# Patient Record
Sex: FEMALE | Race: WHITE | NOT HISPANIC OR LATINO | Employment: OTHER | URBAN - METROPOLITAN AREA
[De-identification: names, ages, dates, MRNs, and addresses within clinical notes are randomized per-mention and may not be internally consistent; named-entity substitution may affect disease eponyms.]

---

## 2023-03-03 LAB
NON-UH HIE C-REACTIVE PROTEIN, QUANTITATIVE: <0.4 MG/DL (ref 0–0.9)
NON-UH HIE CA 125 TM: 10 U/ML (ref 0–35)
NON-UH HIE SED RATE WESTERGREN: 14 MM/HR (ref 0–30)

## 2023-03-06 LAB
NON-UH HIE ANA PATTERN: NORMAL
NON-UH HIE ANTI-NUCLEAR AB QUANT: NORMAL
NON-UH HIE ANTI-NUCLEAR ANTIBODY: POSITIVE

## 2023-03-07 LAB — NON-UH HIE RHEUMATOID FACTOR: NEGATIVE

## 2023-03-12 NOTE — RESULT ENCOUNTER NOTE
Please call Anjelica  Her antinuclear antibody is positive and the titer is 1 in 160 which is significant  I recommend she see a rheumatologist and I will refer

## 2023-03-20 ENCOUNTER — TELEPHONE (OUTPATIENT)
Dept: PRIMARY CARE | Facility: CLINIC | Age: 68
End: 2023-03-20

## 2023-03-20 DIAGNOSIS — R76.8 POSITIVE ANA (ANTINUCLEAR ANTIBODY): ICD-10-CM

## 2023-03-20 NOTE — TELEPHONE ENCOUNTER
----- Message from Lacey Lara MD sent at 3/11/2023  8:02 PM EST -----  Please call Anjelica  Her antinuclear antibody is positive and the titer is 1 in 160 which is significant  I recommend she see a rheumatologist and I will refer.  the rheumatoid factor is negative   Referral entered.

## 2023-03-21 ENCOUNTER — TELEPHONE (OUTPATIENT)
Dept: PRIMARY CARE | Facility: CLINIC | Age: 68
End: 2023-03-21

## 2025-03-28 LAB
NON-UH HIE A/G RATIO: 1.1
NON-UH HIE ALB: 4 G/DL (ref 3.4–5)
NON-UH HIE ALK PHOS: 114 UNIT/L (ref 45–117)
NON-UH HIE BILIRUBIN, TOTAL: 0.6 MG/DL (ref 0.3–1.2)
NON-UH HIE BUN/CREAT RATIO: 14.6
NON-UH HIE BUN: 19 MG/DL (ref 9–23)
NON-UH HIE CALCIUM, IONIZED: 1.29 MMOL/L (ref 1.12–1.32)
NON-UH HIE CALCIUM: 10.6 MG/DL (ref 8.7–10.4)
NON-UH HIE CALCULATED LDL CHOLESTEROL: 100 MG/DL (ref 60–130)
NON-UH HIE CALCULATED OSMOLALITY: 283 MOSM/KG (ref 275–295)
NON-UH HIE CHLORIDE: 106 MMOL/L (ref 98–107)
NON-UH HIE CHOLESTEROL: 176 MG/DL (ref 100–200)
NON-UH HIE CO2, VENOUS: 25 MMOL/L (ref 20–31)
NON-UH HIE CREATININE: 1.3 MG/DL (ref 0.5–0.8)
NON-UH HIE DIRECT LDL: 99 MG/DL
NON-UH HIE GFR AA: 49
NON-UH HIE GLOBULIN: 3.5 G/DL
NON-UH HIE GLOMERULAR FILTRATION RATE: 41 ML/MIN/1.73M?
NON-UH HIE GLUCOSE: 89 MG/DL (ref 74–106)
NON-UH HIE GOT: 23 UNIT/L (ref 15–37)
NON-UH HIE GPT: 20 UNIT/L (ref 10–49)
NON-UH HIE HDL CHOLESTEROL: 58 MG/DL (ref 40–60)
NON-UH HIE HGB A1C: 5.3 %
NON-UH HIE I-PTH: 92 PG/ML (ref 18.4–80.1)
NON-UH HIE K: 4.2 MMOL/L (ref 3.5–5.1)
NON-UH HIE NA: 141 MMOL/L (ref 135–145)
NON-UH HIE TOTAL CHOL/HDL CHOL RATIO: 3
NON-UH HIE TOTAL PROTEIN: 7.5 G/DL (ref 5.7–8.2)
NON-UH HIE TRIGLYCERIDES: 89 MG/DL (ref 30–150)
NON-UH HIE TSH: 0.96 UIU/ML (ref 0.55–4.78)
NON-UH HIE VIT D 25: 47 NG/ML

## 2025-06-09 LAB
NON-UH HIE A/G RATIO: 1.2
NON-UH HIE ALB: 4 G/DL (ref 3.4–5)
NON-UH HIE ALB: 4 G/DL (ref 3.4–5)
NON-UH HIE ALK PHOS: 111 UNIT/L (ref 45–117)
NON-UH HIE BILIRUBIN, TOTAL: 0.6 MG/DL (ref 0.3–1.2)
NON-UH HIE BUN/CREAT RATIO: 13.6
NON-UH HIE BUN/CREAT RATIO: 13.6
NON-UH HIE BUN: 15 MG/DL (ref 9–23)
NON-UH HIE BUN: 15 MG/DL (ref 9–23)
NON-UH HIE CALCIUM: 10.4 MG/DL (ref 8.7–10.4)
NON-UH HIE CALCIUM: 10.4 MG/DL (ref 8.7–10.4)
NON-UH HIE CALCULATED OSMOLALITY: 279 MOSM/KG (ref 275–295)
NON-UH HIE CALCULATED OSMOLALITY: 281 MOSM/KG (ref 275–295)
NON-UH HIE CHLORIDE: 106 MMOL/L (ref 98–107)
NON-UH HIE CHLORIDE: 107 MMOL/L (ref 98–107)
NON-UH HIE CO2, VENOUS: 25 MMOL/L (ref 20–31)
NON-UH HIE CO2, VENOUS: 25 MMOL/L (ref 20–31)
NON-UH HIE CORRECTED CALCIUM: ABNORMAL MG/DL (ref 8.5–10.5)
NON-UH HIE CREATININE 24 HR, U: 1 GM/24HR (ref 0.6–1.8)
NON-UH HIE CREATININE: 1.1 MG/DL (ref 0.5–0.8)
NON-UH HIE CREATININE: 1.1 MG/DL (ref 0.5–0.8)
NON-UH HIE GFR AA: 60
NON-UH HIE GFR AA: 60
NON-UH HIE GFR ESTIMATED: 49
NON-UH HIE GLOBULIN: 3.5 G/DL
NON-UH HIE GLOMERULAR FILTRATION RATE: 49 ML/MIN/1.73M?
NON-UH HIE GLOMERULAR FILTRATION RATE: 49 ML/MIN/1.73M?
NON-UH HIE GLUCOSE: 102 MG/DL (ref 74–106)
NON-UH HIE GLUCOSE: 103 MG/DL (ref 74–106)
NON-UH HIE GOT: 20 UNIT/L (ref 15–37)
NON-UH HIE GPT: 22 UNIT/L (ref 10–49)
NON-UH HIE I-PTH: 83 PG/ML (ref 18.4–80.1)
NON-UH HIE K: 3.9 MMOL/L (ref 3.5–5.1)
NON-UH HIE K: 3.9 MMOL/L (ref 3.5–5.1)
NON-UH HIE NA: 139 MMOL/L (ref 135–145)
NON-UH HIE NA: 140 MMOL/L (ref 135–145)
NON-UH HIE PHOSPHORUS: 3 MG/DL (ref 2–5.1)
NON-UH HIE TOTAL PROTEIN: 7.5 G/DL (ref 5.7–8.2)
NON-UH HIE U CRETN: 42.3 MG/DL
NON-UH HIE U VOLCRET: 2475 ML
NON-UH HIE VIT D 25: 51 NG/ML

## 2025-06-13 LAB — NON-UH HIE MISC SENDOUT: NORMAL

## 2025-08-07 ENCOUNTER — OFFICE VISIT (OUTPATIENT)
Dept: SURGERY | Facility: CLINIC | Age: 70
End: 2025-08-07
Payer: MEDICARE

## 2025-08-07 VITALS
HEART RATE: 90 BPM | DIASTOLIC BLOOD PRESSURE: 79 MMHG | HEIGHT: 64 IN | BODY MASS INDEX: 27.14 KG/M2 | WEIGHT: 159 LBS | SYSTOLIC BLOOD PRESSURE: 136 MMHG

## 2025-08-07 DIAGNOSIS — E83.52 HYPERCALCEMIA: Primary | ICD-10-CM

## 2025-08-07 PROCEDURE — 76536 US EXAM OF HEAD AND NECK: CPT | Performed by: SURGERY

## 2025-08-07 PROCEDURE — 99205 OFFICE O/P NEW HI 60 MIN: CPT | Performed by: SURGERY

## 2025-08-07 PROCEDURE — 3008F BODY MASS INDEX DOCD: CPT | Performed by: SURGERY

## 2025-08-07 PROCEDURE — 99215 OFFICE O/P EST HI 40 MIN: CPT | Mod: 25 | Performed by: SURGERY

## 2025-08-07 PROCEDURE — 1160F RVW MEDS BY RX/DR IN RCRD: CPT | Performed by: SURGERY

## 2025-08-07 PROCEDURE — 1159F MED LIST DOCD IN RCRD: CPT | Performed by: SURGERY

## 2025-08-07 RX ORDER — METOPROLOL TARTRATE 25 MG/1
12.5 TABLET, FILM COATED ORAL 2 TIMES DAILY
COMMUNITY

## 2025-08-07 RX ORDER — CLINDAMYCIN HYDROCHLORIDE 300 MG/1
CAPSULE ORAL
COMMUNITY
Start: 2025-07-18

## 2025-08-07 RX ORDER — FAMOTIDINE 40 MG/1
40 TABLET, FILM COATED ORAL NIGHTLY
COMMUNITY

## 2025-08-07 RX ORDER — LEVOTHYROXINE SODIUM 88 UG/1
88 TABLET ORAL
COMMUNITY

## 2025-08-07 RX ORDER — METHYLPREDNISOLONE 4 MG/1
TABLET ORAL
COMMUNITY
Start: 2024-09-09

## 2025-08-07 RX ORDER — HYDROCORTISONE ACETATE 25 MG/1
SUPPOSITORY RECTAL
COMMUNITY
Start: 2025-05-01

## 2025-08-07 RX ORDER — ATORVASTATIN CALCIUM 10 MG/1
10 TABLET, FILM COATED ORAL DAILY
COMMUNITY

## 2025-08-07 ASSESSMENT — ENCOUNTER SYMPTOMS
CONSTITUTIONAL NEGATIVE: 1
CARDIOVASCULAR NEGATIVE: 1
EYES NEGATIVE: 1
ENDOCRINE NEGATIVE: 1
RESPIRATORY NEGATIVE: 1
MUSCULOSKELETAL NEGATIVE: 1
PSYCHIATRIC NEGATIVE: 1
NEUROLOGICAL NEGATIVE: 1

## 2025-08-07 NOTE — PROGRESS NOTES
Subjective   Patient ID: Kathrin Sutherland is a 69 y.o. female who presents for surgical consultation for primary hyperparathyroidism.    HPI I saw Mrs. Sutherland in surgery clinic today.  She is a pleasant 69-year-old woman referred by Dr. Jordan Vazquez of medical endocrinology for likely primary hyperparathyroidism.    Patient has had high normal to mildly elevated calcium levels ranging from 10.4-10.6.  Her ionized calcium level at last check was normal at 1.29.  However her parathyroid hormone levels been slightly elevated ranging from 83-92 with the upper end of normal being 80 for the labs she went to.  Vitamin D is normal at 51.  Biochemically this looks like at least mild primary hyperparathyroidism.    Interestingly, she brought updated blood work with her today.  Her most recent calcium was actually low at 8.4.  Obviously this is only 1 value but it is certainly confusing.    In terms of symptoms associated with this, from a bone standpoint-she does have DEXA bone density test showing osteoporosis.  No recent fractures.  Renal-no history of kidney stones.  I saw that she had a 24-hour urine creatinine but we do not see that she had a 24-hour urine calcium level done.  She looked on her own patient chart and did not see that either.  GI-no peptic ulcer disease no pancreatitis no constipation.  Neurocognitive energy levels are good no depression.    She was diagnosed with MGUS in the past.    Family history no hyperparathyroidism.    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  Potential occupational exposure.    Review of Systems   Constitutional: Negative.    HENT: Negative.     Eyes: Negative.    Respiratory: Negative.     Cardiovascular: Negative.    Endocrine: Negative.    Musculoskeletal: Negative.    Neurological: Negative.    Psychiatric/Behavioral: Negative.         Objective   Physical Exam  Vitals reviewed.    Constitutional:       Appearance: Normal appearance.     Eyes:      Comments: No proptosis   Neck:      Vascular: No carotid bruit.      Comments: Her thyroid feels normal no palpable masses trachea midline  Cardiovascular:      Rate and Rhythm: Normal rate and regular rhythm.      Heart sounds: Normal heart sounds.   Pulmonary:      Effort: Pulmonary effort is normal. No respiratory distress.      Breath sounds: Normal breath sounds. No wheezing or rales.     Musculoskeletal:         General: Normal range of motion.   Lymphadenopathy:      Cervical: No cervical adenopathy.     Skin:     General: Skin is warm.     Neurological:      General: No focal deficit present.      Mental Status: She is alert and oriented to person, place, and time.     Psychiatric:         Mood and Affect: Mood normal.         Behavior: Behavior normal.     Patient ID: Kathrin Sutherland is a 69 y.o. female.    General    Date/Time: 8/7/2025 2:05 PM    Performed by: Jose De Jesus Spear MD  Authorized by: Jose De Jesus Spear MD    Consent:     Consent obtained:  Verbal    Consent given by:  Patient    Risks, benefits, and alternatives were discussed: yes      Alternatives discussed:  No treatment and observation  Universal protocol:     Procedure explained and questions answered to patient or proxy's satisfaction: yes      Relevant documents present and verified: yes      Test results available: yes      Imaging studies available: yes    Procedure specific details:      Neck ultrasound    In the office of the neck ultrasound a 6-15 MHz linear ultrasound probe to look for possible parathyroid adenoma in a patient with biochemical evidence suspicious for primary hyperparathyroidism.  Patient's thyroid gland is extremely heterogeneous and highly consistent with Hashimoto's thyroiditis.  There is no hypoechoic extrathyroidal mass seen in any position that would be suggestive of a parathyroid adenoma.  We did specifically focus in the area of the left  superior thyroid region at that area was questioned on sestamibi but also read as negative for an adenoma    Images were captured and reviewed with the patient.  Post-procedure details:     Procedure completion:  Tolerated      Assessment/Plan    Mrs. Sutherland has had slightly elevated parathyroid hormone levels on 2 separate occasions ranging from 80-92.  Her calcium levels generally run around 10.4-10.6.  However the most recent calcium she just had done was actually low at 8.4.    I had a good discussion with her and her  that I think she probably does have mild primary hyperparathyroidism.    Both her ultrasound me in the office and sestamibi scan and nuclear medicine are negative for a good target is an obvious parathyroid adenoma.  It is possible she could have multi gland disease with hyperplasia.    She has osteoporosis but she is also a very petite 69-year-old postmenopausal woman.  How much of this is directly related to her hyperparathyroidism is unclear.  The highest area of osteoporosis is actually in her hip.  Her forearm views are actually normal.  No osteopenia or osteoporosis.  Typically the cortical bone of the forearm is the most sensitive to parathyroid induced bone loss.    Putting all of this together, she may have very mild primary hyperparathyroidism.  I do not think that this is a direct source of her osteoporosis based on the pattern of her bone loss on DEXA bone density testing.  She has no obvious visible target as a solitary parathyroid adenoma.  I told her and her  I would recommend monitoring her for now.  I am not offering her parathyroidectomy.    She should follow-up with her endocrinologist and get calcium PTH and vitamin D testing at least twice a year.  If things start to worsen in the future we can always still reconsider surgery.    They are very comfortable with this plan.         Jose De Jesus Spear MD 08/07/25 1:33 PM